# Patient Record
Sex: MALE | Race: WHITE | NOT HISPANIC OR LATINO | Employment: FULL TIME | URBAN - METROPOLITAN AREA
[De-identification: names, ages, dates, MRNs, and addresses within clinical notes are randomized per-mention and may not be internally consistent; named-entity substitution may affect disease eponyms.]

---

## 2022-03-17 ENCOUNTER — OFFICE VISIT (OUTPATIENT)
Dept: FAMILY MEDICINE CLINIC | Facility: CLINIC | Age: 42
End: 2022-03-17

## 2022-03-17 VITALS
BODY MASS INDEX: 31.99 KG/M2 | HEIGHT: 69 IN | TEMPERATURE: 98 F | DIASTOLIC BLOOD PRESSURE: 90 MMHG | OXYGEN SATURATION: 98 % | WEIGHT: 216 LBS | SYSTOLIC BLOOD PRESSURE: 148 MMHG | HEART RATE: 64 BPM

## 2022-03-17 DIAGNOSIS — L02.91 ABSCESS: ICD-10-CM

## 2022-03-17 DIAGNOSIS — R03.0 ELEVATED BP WITHOUT DIAGNOSIS OF HYPERTENSION: ICD-10-CM

## 2022-03-17 DIAGNOSIS — Z13.220 LIPID SCREENING: ICD-10-CM

## 2022-03-17 DIAGNOSIS — Z13.29 SCREENING FOR THYROID DISORDER: ICD-10-CM

## 2022-03-17 DIAGNOSIS — Z01.89 ENCOUNTER FOR ROUTINE LABORATORY TESTING: Primary | ICD-10-CM

## 2022-03-17 PROBLEM — Z78.9 NO PERTINENT PAST MEDICAL HISTORY: Status: ACTIVE | Noted: 2022-03-17

## 2022-03-17 LAB
ALBUMIN SERPL-MCNC: 4.5 G/DL (ref 3.5–5.2)
ALBUMIN/GLOB SERPL: 1.7 G/DL
ALP SERPL-CCNC: 59 U/L (ref 39–117)
ALT SERPL W P-5'-P-CCNC: 28 U/L (ref 1–41)
ANION GAP SERPL CALCULATED.3IONS-SCNC: 9.2 MMOL/L (ref 5–15)
AST SERPL-CCNC: 21 U/L (ref 1–40)
BILIRUB SERPL-MCNC: 0.8 MG/DL (ref 0–1.2)
BUN SERPL-MCNC: 15 MG/DL (ref 6–20)
BUN/CREAT SERPL: 16.3 (ref 7–25)
CALCIUM SPEC-SCNC: 9.4 MG/DL (ref 8.6–10.5)
CHLORIDE SERPL-SCNC: 101 MMOL/L (ref 98–107)
CHOLEST SERPL-MCNC: 188 MG/DL (ref 0–200)
CO2 SERPL-SCNC: 28.8 MMOL/L (ref 22–29)
CREAT SERPL-MCNC: 0.92 MG/DL (ref 0.76–1.27)
DEPRECATED RDW RBC AUTO: 41 FL (ref 37–54)
EGFRCR SERPLBLD CKD-EPI 2021: 107.8 ML/MIN/1.73
ERYTHROCYTE [DISTWIDTH] IN BLOOD BY AUTOMATED COUNT: 12.7 % (ref 12.3–15.4)
GLOBULIN UR ELPH-MCNC: 2.6 GM/DL
GLUCOSE SERPL-MCNC: 96 MG/DL (ref 65–99)
HCT VFR BLD AUTO: 46.5 % (ref 37.5–51)
HDLC SERPL-MCNC: 40 MG/DL (ref 40–60)
HGB BLD-MCNC: 15.8 G/DL (ref 13–17.7)
LDLC SERPL CALC-MCNC: 135 MG/DL (ref 0–100)
LDLC/HDLC SERPL: 3.37 {RATIO}
MCH RBC QN AUTO: 30.2 PG (ref 26.6–33)
MCHC RBC AUTO-ENTMCNC: 34 G/DL (ref 31.5–35.7)
MCV RBC AUTO: 88.9 FL (ref 79–97)
PLATELET # BLD AUTO: 151 10*3/MM3 (ref 140–450)
PMV BLD AUTO: 12.4 FL (ref 6–12)
POTASSIUM SERPL-SCNC: 4.4 MMOL/L (ref 3.5–5.2)
PROT SERPL-MCNC: 7.1 G/DL (ref 6–8.5)
RBC # BLD AUTO: 5.23 10*6/MM3 (ref 4.14–5.8)
SODIUM SERPL-SCNC: 139 MMOL/L (ref 136–145)
TRIGL SERPL-MCNC: 67 MG/DL (ref 0–150)
TSH SERPL DL<=0.05 MIU/L-ACNC: 1.47 UIU/ML (ref 0.27–4.2)
VLDLC SERPL-MCNC: 13 MG/DL (ref 5–40)
WBC NRBC COR # BLD: 5.43 10*3/MM3 (ref 3.4–10.8)

## 2022-03-17 PROCEDURE — 99203 OFFICE O/P NEW LOW 30 MIN: CPT | Performed by: NURSE PRACTITIONER

## 2022-03-17 PROCEDURE — 80050 GENERAL HEALTH PANEL: CPT | Performed by: NURSE PRACTITIONER

## 2022-03-17 PROCEDURE — 10060 I&D ABSCESS SIMPLE/SINGLE: CPT | Performed by: NURSE PRACTITIONER

## 2022-03-17 PROCEDURE — 80061 LIPID PANEL: CPT | Performed by: NURSE PRACTITIONER

## 2022-03-17 NOTE — PATIENT INSTRUCTIONS
Preventive Care 40-64 Years Old, Male  Preventive care refers to lifestyle choices and visits with your health care provider that can promote health and wellness. This includes:  A yearly physical exam. This is also called an annual well check.  Regular dental and eye exams.  Immunizations.  Screening for certain conditions.  Healthy lifestyle choices, such as eating a healthy diet, getting regular exercise, not using drugs or products that contain nicotine and tobacco, and limiting alcohol use.  What can I expect for my preventive care visit?  Physical exam  Your health care provider will check:  Height and weight. These may be used to calculate body mass index (BMI), which is a measurement that tells if you are at a healthy weight.  Heart rate and blood pressure.  Your skin for abnormal spots.  Counseling  Your health care provider may ask you questions about:  Alcohol, tobacco, and drug use.  Emotional well-being.  Home and relationship well-being.  Sexual activity.  Eating habits.  Work and work environment.  What immunizations do I need?    Influenza (flu) vaccine  This is recommended every year.  Tetanus, diphtheria, and pertussis (Tdap) vaccine  You may need a Td booster every 10 years.  Varicella (chickenpox) vaccine  You may need this vaccine if you have not already been vaccinated.  Zoster (shingles) vaccine  You may need this after age 60.  Measles, mumps, and rubella (MMR) vaccine  You may need at least one dose of MMR if you were born in 1957 or later. You may also need a second dose.  Pneumococcal conjugate (PCV13) vaccine  You may need this if you have certain conditions and were not previously vaccinated.  Pneumococcal polysaccharide (PPSV23) vaccine  You may need one or two doses if you smoke cigarettes or if you have certain conditions.  Meningococcal conjugate (MenACWY) vaccine  You may need this if you have certain conditions.  Hepatitis A vaccine  You may need this if you have certain conditions  or if you travel or work in places where you may be exposed to hepatitis A.  Hepatitis B vaccine  You may need this if you have certain conditions or if you travel or work in places where you may be exposed to hepatitis B.  Haemophilus influenzae type b (Hib) vaccine  You may need this if you have certain risk factors.  Human papillomavirus (HPV) vaccine  If recommended by your health care provider, you may need three doses over 6 months.  You may receive vaccines as individual doses or as more than one vaccine together in one shot (combination vaccines). Talk with your health care provider about the risks and benefits of combination vaccines.  What tests do I need?  Blood tests  Lipid and cholesterol levels. These may be checked every 5 years, or more frequently if you are over 50 years old.  Hepatitis C test.  Hepatitis B test.  Screening  Lung cancer screening. You may have this screening every year starting at age 55 if you have a 30-pack-year history of smoking and currently smoke or have quit within the past 15 years.  Prostate cancer screening. Recommendations will vary depending on your family history and other risks.  Colorectal cancer screening. All adults should have this screening starting at age 50 and continuing until age 75. Your health care provider may recommend screening at age 45 if you are at increased risk. You will have tests every 1-10 years, depending on your results and the type of screening test.  Diabetes screening. This is done by checking your blood sugar (glucose) after you have not eaten for a while (fasting). You may have this done every 1-3 years.  Sexually transmitted disease (STD) testing.  Follow these instructions at home:  Eating and drinking  Eat a diet that includes fresh fruits and vegetables, whole grains, lean protein, and low-fat dairy products.  Take vitamin and mineral supplements as recommended by your health care provider.  Do not drink alcohol if your health care  provider tells you not to drink.  If you drink alcohol:  Limit how much you have to 0-2 drinks a day.  Be aware of how much alcohol is in your drink. In the [place] one drink equals one 12 oz bottle of beer (355 mL), one 5 oz glass of wine (148 mL), or one 1½ oz glass of hard liquor (44 mL).  Lifestyle  Take daily care of your teeth and gums.  Stay active. Exercise for at least 30 minutes on 5 or more days each week.  Do not use any products that contain nicotine or tobacco, such as cigarettes, e-cigarettes, and chewing tobacco. If you need help quitting, ask your health care provider.  If you are sexually active, practice safe sex. Use a condom or other form of protection to prevent STIs (sexually transmitted infections).  Talk with your health care provider about taking a low-dose aspirin every day starting at age 50.  What's next?  Go to your health care provider once a year for a well check visit.  Ask your health care provider how often you should have your eyes and teeth checked.  Stay up to date on all vaccines.  This information is not intended to replace advice given to you by your health care provider. Make sure you discuss any questions you have with your health care provider.  Document Revised: 12/12/2019 Document Reviewed: 12/12/2019  Elsevier Patient Education © 2020 [employer]

## 2022-03-17 NOTE — PROGRESS NOTES
"Chief Complaint  Establish Care , cyst on back     Subjective          Akash Springer is a 41 y.o. male who presents to [facility]    History of Present Illness    Cyst on back ongoing for 7-8 years. Pt reports not painful but growing.     Mild elevation in b/p noted.       PHQ-2 Total Score:     PHQ-9 Total Score:         Review of Systems   Respiratory: Negative for chest tightness and shortness of breath.    Gastrointestinal: Negative for abdominal pain.   Genitourinary: Negative for difficulty urinating.   Skin:        Cyst left shoulder posterior   Neurological: Negative for headaches.   Psychiatric/Behavioral: Negative for sleep disturbance. The patient is not nervous/anxious.           Medical History: has no past medical history on file.     Surgical History: has a past surgical history that includes Hernia repair and Gallbladder surgery.     Family History: family history is not on file.     Social History: reports that he quit smoking about 4 years ago. His smoking use included cigarettes. He smoked 0.50 packs per day. He quit smokeless tobacco use about 2 years ago.  His smokeless tobacco use included chew.    Allergies: Patient has no known allergies.      Health Maintenance Due   Topic Date Due   • ANNUAL PHYSICAL  Never done   • TDAP/TD VACCINES (2 - Tdap) 08/06/2007   • HEPATITIS C SCREENING  Never done          No current outpatient medications on file.      Immunization History   Administered Date(s) Administered   • Td 08/06/1997         Objective       Vitals:    03/17/22 0844   BP: 148/90   BP Location: Right arm   Patient Position: Sitting   Pulse: 64   Temp: 98 °F (36.7 °C)   SpO2: 98%   Weight: 98 kg (216 lb)   Height: 175.3 cm (69\")      Body mass index is 31.9 kg/m².   Wt Readings from Last 3 Encounters:   03/17/22 98 kg (216 lb)      BP Readings from Last 3 Encounters:   03/17/22 148/90        Physical Exam  Vitals reviewed.   Constitutional:       " Appearance: Normal appearance. He is well-developed.   HENT:      Head: Normocephalic and atraumatic.   Eyes:      Conjunctiva/sclera: Conjunctivae normal.      Pupils: Pupils are equal, round, and reactive to light.   Cardiovascular:      Rate and Rhythm: Normal rate and regular rhythm.      Heart sounds: Normal heart sounds. No murmur heard.  Pulmonary:      Effort: Pulmonary effort is normal.      Breath sounds: Normal breath sounds. No wheezing or rhonchi.   Abdominal:      General: Bowel sounds are normal. There is no distension.      Palpations: Abdomen is soft.      Tenderness: There is no abdominal tenderness.   Skin:     General: Skin is warm and dry.      Findings: Abscess present.      Comments: 3 cm abscess present on left posterior shoulder.    Neurological:      Mental Status: He is alert and oriented to person, place, and time.   Psychiatric:         Mood and Affect: Mood and affect normal.         Behavior: Behavior normal.         Thought Content: Thought content normal.         Judgment: Judgment normal.             Result Review :         Incision & Drainage    Date/Time: 3/17/2022 10:15 AM  Performed by: Rosina Florentino APRN  Authorized by: Rosina Florentino APRN   Type: abscess  Body area: upper extremity  Location details: left shoulder  Anesthesia: local infiltration    Anesthesia:  Local Anesthetic: lidocaine 1% without epinephrine  Anesthetic total: 3 mL    Sedation:  Patient sedated: no    Scalpel size: 10  Needle gauge: 22  Incision type: single straight  Drainage: purulent  Drainage amount: moderate  Wound treatment: wound left open  Packing material: Vaseline gauze  Patient tolerance: patient tolerated the procedure well with no immediate complications  Comments: Site prepped with betadine, injected at base with lidocaine 1%, central 1 cm incision made. Large amount of white thick drainage expressed. Large capsule removed. Bleeding controlled. vasoline and non-adherenant dressing place.                Assessment and Plan        Diagnoses and all orders for this visit:    1. Encounter for routine laboratory testing (Primary)  -     CBC (No Diff)    2. Screening for thyroid disorder  -     TSH    3. Lipid screening  -     Lipid Panel  -     Comprehensive Metabolic Panel    4. Abscess  -     Incision & Drainage    5. Elevated BP without diagnosis of hypertension  Comments:  monitor b/p call with readings above 140/90.      Keep wound clean and dry. Apply vasoline and non-adherent dressing.     Follow Up     Return in about 1 year (around 3/17/2023) for Annual physical.     Updated annual wellness visit checklist.  Immunizations up-to-date.  Screening up-to-date or ordered.  Recommend yearly dental and eye exams. Also discussed monitoring of blood pressure and lipids.        Patient was given instructions and counseling regarding his condition or for health maintenance advice. Please see specific information pulled into the AVS if appropriate.     CHARO Heredia

## 2022-03-21 ENCOUNTER — TELEPHONE (OUTPATIENT)
Dept: FAMILY MEDICINE CLINIC | Facility: CLINIC | Age: 42
End: 2022-03-21

## 2022-12-13 ENCOUNTER — OFFICE VISIT (OUTPATIENT)
Dept: FAMILY MEDICINE CLINIC | Facility: CLINIC | Age: 42
End: 2022-12-13

## 2022-12-13 VITALS
BODY MASS INDEX: 32.7 KG/M2 | TEMPERATURE: 98 F | HEIGHT: 69 IN | SYSTOLIC BLOOD PRESSURE: 142 MMHG | HEART RATE: 74 BPM | WEIGHT: 220.8 LBS | OXYGEN SATURATION: 98 % | DIASTOLIC BLOOD PRESSURE: 98 MMHG

## 2022-12-13 DIAGNOSIS — I10 PRIMARY HYPERTENSION: Primary | ICD-10-CM

## 2022-12-13 PROCEDURE — 99203 OFFICE O/P NEW LOW 30 MIN: CPT | Performed by: NURSE PRACTITIONER

## 2022-12-13 RX ORDER — LOSARTAN POTASSIUM 50 MG/1
50 TABLET ORAL DAILY
Qty: 30 TABLET | Refills: 2 | Status: SHIPPED | OUTPATIENT
Start: 2022-12-13 | End: 2023-01-13 | Stop reason: SDUPTHER

## 2022-12-13 NOTE — PROGRESS NOTES
"Chief Complaint  Hypertension (Increasing higher blood pressure readings, Stroke history in family )    Subjective         Akash Springer presents to [facility]  HPI     Had a physical last week and BP was elevated.  Mom was 59 and passed away with a stroke, father has had 2 strokes, brother and sister both have heart conditions. Has noticed increased headaches.    PHQ-2 Total Score:    PHQ-9 Total Score:      Review of Systems    Social History     Socioeconomic History   • Marital status:    Tobacco Use   • Smoking status: Former     Packs/day: 0.50     Types: Cigarettes     Quit date:      Years since quittin.9   • Smokeless tobacco: Former     Types: Chew     Quit date:    Vaping Use   • Vaping Use: Never used   Substance and Sexual Activity   • Alcohol use: Yes     Comment: Social   • Drug use: Never        Objective     Vitals:    22 1448 22 1520   BP: (!) 146/107 142/98   BP Location: Right arm Right arm   Patient Position: Sitting Sitting   Cuff Size: Adult Adult   Pulse: 74    Temp: 98 °F (36.7 °C)    TempSrc: Temporal    SpO2: 98%    Weight: 100 kg (220 lb 12.8 oz)    Height: 175.3 cm (69\")         Body mass index is 32.61 kg/m².    Wt Readings from Last 3 Encounters:   22 100 kg (220 lb 12.8 oz)   22 98 kg (216 lb)       BP Readings from Last 3 Encounters:   22 142/98   22 148/90             Physical Exam  Vitals reviewed.   Constitutional:       Appearance: Normal appearance.   HENT:      Head: Normocephalic and atraumatic.   Eyes:      Conjunctiva/sclera: Conjunctivae normal.      Pupils: Pupils are equal, round, and reactive to light.   Cardiovascular:      Rate and Rhythm: Normal rate and regular rhythm.      Pulses: Normal pulses.      Heart sounds: Normal heart sounds.   Pulmonary:      Effort: Pulmonary effort is normal.      Breath sounds: Normal breath sounds.   Abdominal:      General: Bowel sounds " are normal.      Palpations: Abdomen is soft.   Musculoskeletal:      Cervical back: Normal range of motion.   Skin:     General: Skin is warm and dry.   Neurological:      Mental Status: He is alert and oriented to person, place, and time.   Psychiatric:         Mood and Affect: Mood normal.         Behavior: Behavior normal.         Thought Content: Thought content normal.         Judgment: Judgment normal.          Result Review :   The following data was reviewed by: CHARO Dooley on 12/13/2022:      Procedures    Assessment and Plan   Diagnoses and all orders for this visit:    1. Primary hypertension (Primary)  Comments:  Adgised patient to buy a BP monitor at home.  Start taking BP readings once a day at different times of the day and record readings to bring to visit.  Orders:  -     losartan (Cozaar) 50 MG tablet; Take 1 tablet by mouth Daily.  Dispense: 30 tablet; Refill: 2        Follow Up   Return in about 4 weeks (around 1/10/2023).     Patient advised to monitor blood pressure at home and journal readings.  Patient informed that a blood pressure reading at home of more than 130/80 is considered hypertension.  For readings greater than 140/90 or higher patient is advised to follow-up in the office with readings for management.  Patient advised to limit sodium intake.    Patient was given instructions and counseling regarding his condition or for health maintenance advice. Please see specific information pulled into the AVS if appropriate.     Please note that portions of this note were completed with a voice recognition program.    Electronically signed by CHARO Dooley  12/13/2022, 15:25 EST    Answers for HPI/ROS submitted by the patient on 12/7/2022  What is the primary reason for your visit?: High Blood Pressure

## 2023-01-13 ENCOUNTER — OFFICE VISIT (OUTPATIENT)
Dept: FAMILY MEDICINE CLINIC | Facility: CLINIC | Age: 43
End: 2023-01-13
Payer: COMMERCIAL

## 2023-01-13 VITALS
DIASTOLIC BLOOD PRESSURE: 86 MMHG | BODY MASS INDEX: 33.24 KG/M2 | SYSTOLIC BLOOD PRESSURE: 134 MMHG | OXYGEN SATURATION: 99 % | HEART RATE: 78 BPM | TEMPERATURE: 97.6 F | WEIGHT: 224.4 LBS | HEIGHT: 69 IN

## 2023-01-13 DIAGNOSIS — I10 PRIMARY HYPERTENSION: Primary | ICD-10-CM

## 2023-01-13 DIAGNOSIS — E78.2 MIXED HYPERLIPIDEMIA: ICD-10-CM

## 2023-01-13 PROCEDURE — 99213 OFFICE O/P EST LOW 20 MIN: CPT | Performed by: NURSE PRACTITIONER

## 2023-01-13 RX ORDER — LOSARTAN POTASSIUM 50 MG/1
50 TABLET ORAL DAILY
Qty: 90 TABLET | Refills: 1 | Status: SHIPPED | OUTPATIENT
Start: 2023-01-13

## 2023-01-13 NOTE — PROGRESS NOTES
"Chief Complaint  Hypertension    Subjective         Akash Springer presents to [facility]  HPI     HTN-taking Losartan, headaches improved, BP's looking good at home.  Feeling good.    PHQ-2 Total Score:    PHQ-9 Total Score:      Review of Systems    Social History     Socioeconomic History   • Marital status:    Tobacco Use   • Smoking status: Former     Packs/day: 0.50     Types: Cigarettes     Quit date:      Years since quittin.0   • Smokeless tobacco: Former     Types: Chew     Quit date:    Vaping Use   • Vaping Use: Never used   Substance and Sexual Activity   • Alcohol use: Yes     Comment: Social   • Drug use: Never        Objective     Vitals:    23 0707   BP: 134/86   BP Location: Left arm   Patient Position: Sitting   Cuff Size: Adult   Pulse: 78   Temp: 97.6 °F (36.4 °C)   TempSrc: Temporal   SpO2: 99%   Weight: 102 kg (224 lb 6.4 oz)   Height: 175.3 cm (69\")        Body mass index is 33.14 kg/m².    Wt Readings from Last 3 Encounters:   23 102 kg (224 lb 6.4 oz)   22 100 kg (220 lb 12.8 oz)   22 98 kg (216 lb)       BP Readings from Last 3 Encounters:   23 134/86   22 142/98   22 148/90           Physical Exam  Vitals reviewed.   Constitutional:       Appearance: Normal appearance.   HENT:      Head: Normocephalic and atraumatic.   Eyes:      Conjunctiva/sclera: Conjunctivae normal.      Pupils: Pupils are equal, round, and reactive to light.   Cardiovascular:      Rate and Rhythm: Normal rate and regular rhythm.      Pulses: Normal pulses.      Heart sounds: Normal heart sounds.   Pulmonary:      Effort: Pulmonary effort is normal.      Breath sounds: Normal breath sounds.   Abdominal:      General: Bowel sounds are normal.      Palpations: Abdomen is soft.   Musculoskeletal:      Cervical back: Normal range of motion.   Skin:     General: Skin is warm and dry.   Neurological:      Mental Status: He " is alert and oriented to person, place, and time.   Psychiatric:         Mood and Affect: Mood normal.         Behavior: Behavior normal.         Thought Content: Thought content normal.         Judgment: Judgment normal.          Result Review :   The following data was reviewed by: CHARO Dooley on 01/13/2023:      Procedures    Assessment and Plan   Diagnoses and all orders for this visit:    1. Primary hypertension (Primary)  Comments:  Advised to continue to take BP at home 2-3 times a week.  Orders:  -     Comprehensive Metabolic Panel; Future  -     CBC & Differential; Future  -     TSH; Future  -     T4, free; Future  -     losartan (Cozaar) 50 MG tablet; Take 1 tablet by mouth Daily.  Dispense: 90 tablet; Refill: 1    2. Mixed hyperlipidemia  -     Lipid Panel; Future            Follow Up   Return in about 6 months (around 7/13/2023).  Patient was given instructions and counseling regarding his condition or for health maintenance advice. Please see specific information pulled into the AVS if appropriate.     Please note that portions of this note were completed with a voice recognition program.    Electronically signed by CHARO Dooley  01/13/2023, 08:18 EST

## 2023-07-14 PROBLEM — I10 PRIMARY HYPERTENSION: Status: ACTIVE | Noted: 2023-07-14

## 2024-09-18 ENCOUNTER — OFFICE VISIT (OUTPATIENT)
Dept: FAMILY MEDICINE CLINIC | Facility: CLINIC | Age: 44
End: 2024-09-18
Payer: COMMERCIAL

## 2024-09-18 VITALS
BODY MASS INDEX: 33.44 KG/M2 | TEMPERATURE: 98.8 F | HEIGHT: 69 IN | DIASTOLIC BLOOD PRESSURE: 97 MMHG | HEART RATE: 66 BPM | RESPIRATION RATE: 18 BRPM | OXYGEN SATURATION: 100 % | WEIGHT: 225.8 LBS | SYSTOLIC BLOOD PRESSURE: 130 MMHG

## 2024-09-18 DIAGNOSIS — E78.2 MIXED HYPERLIPIDEMIA: Primary | ICD-10-CM

## 2024-09-18 DIAGNOSIS — I10 PRIMARY HYPERTENSION: ICD-10-CM

## 2024-09-18 PROCEDURE — 99213 OFFICE O/P EST LOW 20 MIN: CPT | Performed by: NURSE PRACTITIONER

## 2024-09-18 RX ORDER — LOSARTAN POTASSIUM 50 MG/1
50 TABLET ORAL DAILY
Qty: 90 TABLET | Refills: 3 | Status: SHIPPED | OUTPATIENT
Start: 2024-09-18

## 2025-02-12 ENCOUNTER — OFFICE VISIT (OUTPATIENT)
Dept: FAMILY MEDICINE CLINIC | Facility: CLINIC | Age: 45
End: 2025-02-12
Payer: COMMERCIAL

## 2025-02-12 VITALS
WEIGHT: 222.2 LBS | BODY MASS INDEX: 32.91 KG/M2 | DIASTOLIC BLOOD PRESSURE: 85 MMHG | HEIGHT: 69 IN | OXYGEN SATURATION: 95 % | SYSTOLIC BLOOD PRESSURE: 134 MMHG | HEART RATE: 77 BPM

## 2025-02-12 DIAGNOSIS — F41.9 ANXIETY: Primary | ICD-10-CM

## 2025-02-12 PROCEDURE — 99214 OFFICE O/P EST MOD 30 MIN: CPT | Performed by: NURSE PRACTITIONER

## 2025-02-12 NOTE — PROGRESS NOTES
Chief Complaint  Anxiety (Wants meds for anxiety and anger flare ups )    Subjective          Akash Springer is a 44 y.o. male who presents to [facility]    History of Present Illness    Akash states he got  last year, since then he has realized that he and his wife want to work it out and our attempting reconciliation.  He states his problem is his short temper, easily annoyed, raises his voice quickly.  He has been to a therapist before but didn't think it helped.  He's here today because he would like to try something to help with his anxiety and stress.      PHQ-2 Total Score:     PHQ-9 Total Score:         Review of Systems   Respiratory:  Negative for shortness of breath.    Cardiovascular:  Negative for chest pain and palpitations.   Gastrointestinal:  Negative for nausea.   Neurological:  Negative for dizziness.   Psychiatric/Behavioral:  Negative for confusion.           Medical History: has a past medical history of Cholelithiasis (2016) and Hypertension.     Surgical History: has a past surgical history that includes Hernia repair; Gallbladder surgery; and Cholecystectomy (2016).     Family History: family history includes Hyperlipidemia in his father and mother; Stroke in his father and mother.     Social History: reports that he quit smoking about 7 years ago. His smoking use included cigarettes. He started smoking about 12 years ago. He has a 2.5 pack-year smoking history. He quit smokeless tobacco use about 5 years ago.  His smokeless tobacco use included chew. He reports current alcohol use of about 6.0 - 12.0 standard drinks of alcohol per week. He reports that he does not use drugs.    Allergies: Patient has no known allergies.      Health Maintenance Due   Topic Date Due    ANNUAL PHYSICAL  Never done    LIPID PANEL  07/11/2024            Current Outpatient Medications:     losartan (Cozaar) 50 MG tablet, Take 1 tablet by mouth Daily., Disp: 90  "tablet, Rfl: 3    sertraline (ZOLOFT) 50 MG tablet, Take 1 tablet by mouth Daily., Disp: 30 tablet, Rfl: 1      Immunization History   Administered Date(s) Administered    Td (TDVAX) 08/06/1997         Objective       Vitals:    02/12/25 0801   BP: 134/85   Pulse: 77   SpO2: 95%   Weight: 101 kg (222 lb 3.2 oz)   Height: 175.3 cm (69\")      Body mass index is 32.81 kg/m².   Wt Readings from Last 3 Encounters:   02/12/25 101 kg (222 lb 3.2 oz)   09/18/24 102 kg (225 lb 12.8 oz)   07/14/23 103 kg (228 lb)      BP Readings from Last 3 Encounters:   02/12/25 134/85   09/18/24 130/97   07/14/23 138/89                Physical Exam  Vitals reviewed.   Constitutional:       Appearance: Normal appearance.   Skin:     General: Skin is warm and dry.   Neurological:      Mental Status: He is alert and oriented to person, place, and time.   Psychiatric:         Mood and Affect: Mood normal.         Behavior: Behavior normal.         Thought Content: Thought content normal.         Judgment: Judgment normal.             Result Review :                          Assessment and Plan        Diagnoses and all orders for this visit:    1. Anxiety (Primary)  Comments:  Start on Sertraline 25 mg daily and fu in 6 weeks.  Orders:  -     sertraline (ZOLOFT) 50 MG tablet; Take 1 tablet by mouth Daily.  Dispense: 30 tablet; Refill: 1          Follow Up     Return in about 6 weeks (around 3/26/2025).    Patient was given instructions and counseling regarding his condition or for health maintenance advice. Please see specific information pulled into the AVS if appropriate. Patient understands the importance of having any ordered tests to be performed in a timely fashion.      I spent 30 minutes caring for Akash on this date of service. This time includes time spent by me in the following activities: preparing for the visit, reviewing tests, performing a medically appropriate examination and/or evaluation, counseling and educating the " patient/family/caregiver, referring and communicating with other health care professionals, documenting information in the medical record, independently interpreting results and communicating that information with the patient/family/caregiver, care coordination, ordering medications, and ordering test(s)      Anuradha Lance, APRN

## 2025-03-26 ENCOUNTER — OFFICE VISIT (OUTPATIENT)
Dept: FAMILY MEDICINE CLINIC | Facility: CLINIC | Age: 45
End: 2025-03-26
Payer: COMMERCIAL

## 2025-03-26 VITALS
HEIGHT: 69 IN | DIASTOLIC BLOOD PRESSURE: 89 MMHG | WEIGHT: 225 LBS | BODY MASS INDEX: 33.33 KG/M2 | HEART RATE: 61 BPM | SYSTOLIC BLOOD PRESSURE: 133 MMHG

## 2025-03-26 DIAGNOSIS — F41.9 ANXIETY: Primary | ICD-10-CM

## 2025-03-26 PROCEDURE — 99213 OFFICE O/P EST LOW 20 MIN: CPT | Performed by: NURSE PRACTITIONER

## 2025-03-26 NOTE — PROGRESS NOTES
"Chief Complaint  Anxiety, Hyperlipidemia, and Hypertension    Subjective        Akash Springer presents to [facility]  Anxiety  Presents for follow-up visit.  Patient reports no chest pain or nausea. The quality of sleep is good. Awakens seldom (If awakened it's due to child waking up.) during the night. Compliance with medications is %.   Hypertension  Associated symptoms include anxiety. Pertinent negatives include no chest pain, headaches or neck pain. Risk factors for coronary artery disease include male gender and stress.     Follow up  Pertinent negative symptoms include no abdominal pain, no anorexia, no joint pain, no change in stool, no chest pain, no chills, no congestion, no cough, no diaphoresis, no fatigue, no fever, no headaches, no joint swelling, no myalgias, no nausea, no neck pain, no numbness, no rash, no sore throat, no swollen glands, no dysuria, no vertigo, no visual change, no vomiting and no weakness.       Objective   Vital Signs:  /89   Pulse 61   Ht 175.3 cm (69\")   Wt 102 kg (225 lb)   PF 99 L/min   BMI 33.23 kg/m²   Estimated body mass index is 33.23 kg/m² as calculated from the following:    Height as of this encounter: 175.3 cm (69\").    Weight as of this encounter: 102 kg (225 lb).            Physical Exam  Vitals and nursing note reviewed.   Constitutional:       Appearance: Normal appearance.   Neurological:      General: No focal deficit present.      Mental Status: He is alert and oriented to person, place, and time. Mental status is at baseline.   Psychiatric:         Mood and Affect: Mood normal.         Behavior: Behavior normal.         Thought Content: Thought content normal.         Judgment: Judgment normal.        Result Review :        Assessment and Plan   Diagnoses and all orders for this visit:    1. Anxiety (Primary)  Comments:  Doing well on Sertraline 50 mg. Home life stressors are reduced. Wants to continue " on dose.  Orders:  -     sertraline (ZOLOFT) 50 MG tablet; Take 1 tablet by mouth Daily.  Dispense: 90 tablet; Refill: 3       Follow Up     Return in about 3 months (around 6/26/2025).  Patient was given instructions and counseling regarding his condition or for health maintenance advice. Please see specific information pulled into the AVS if appropriate.     ILiv, participated in this visit as a Family Nurse Practitioner student.

## 2025-06-26 ENCOUNTER — OFFICE VISIT (OUTPATIENT)
Dept: FAMILY MEDICINE CLINIC | Facility: CLINIC | Age: 45
End: 2025-06-26
Payer: COMMERCIAL

## 2025-06-26 VITALS
OXYGEN SATURATION: 100 % | BODY MASS INDEX: 33.62 KG/M2 | SYSTOLIC BLOOD PRESSURE: 124 MMHG | HEIGHT: 69 IN | HEART RATE: 54 BPM | WEIGHT: 227 LBS | DIASTOLIC BLOOD PRESSURE: 86 MMHG | TEMPERATURE: 98.1 F

## 2025-06-26 DIAGNOSIS — Z00.00 ENCOUNTER FOR MEDICAL EXAMINATION TO ESTABLISH CARE: ICD-10-CM

## 2025-06-26 DIAGNOSIS — F41.1 GAD (GENERALIZED ANXIETY DISORDER): ICD-10-CM

## 2025-06-26 DIAGNOSIS — I10 PRIMARY HYPERTENSION: ICD-10-CM

## 2025-06-26 DIAGNOSIS — E66.9 OBESITY (BMI 30-39.9): ICD-10-CM

## 2025-06-26 DIAGNOSIS — Z00.00 ANNUAL PHYSICAL EXAM: Primary | ICD-10-CM

## 2025-06-26 DIAGNOSIS — Z13.220 SCREENING FOR LIPID DISORDERS: ICD-10-CM

## 2025-06-26 DIAGNOSIS — Z13.29 SCREENING FOR THYROID DISORDER: ICD-10-CM

## 2025-06-26 NOTE — PROGRESS NOTES
"Akash Springer presents to [facility] with complaint of  Establish Care    SUBJECTIVE  History of Present Illness    History of Present Illness  The patient is a 44-year-old male who presents to establish care with a new provider in the clinic as his former primary care physician left the office. He is also following up for anxiety.    He was previously under the care of Anuradha Lance, who initiated an Zoloft 50 mg daily regimen a few months ago. He reports a general improvement in his condition, with a notable decrease in irritability, particularly towards his children. He has 3 children and feels better overall.     He has been on losartan for approximately 1.5 to 2 years to manage his blood pressure. He expresses interest in undergoing laboratory tests, as he has not had any recent ones.     He reports a history of smoking but has quit and no longer tolerates being around cigarette smoke.    SOCIAL HISTORY  He is a former smoker and has quit smoking. He has 3 children. 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      The following portions of the patient's history were reviewed and updated as appropriate: allergies, current medications, past family history, past medical history, past social history, past surgical history and problem list.    OBJECTIVE  Vital Signs:   /86   Pulse 54   Temp 98.1 °F (36.7 °C) (Oral)   Ht 175.3 cm (69\")   Wt 103 kg (227 lb)   SpO2 100%   BMI 33.52 kg/m²       Physical Exam  Constitutional:       General: He is not in acute distress.     Appearance: He is obese. He is not ill-appearing.   HENT:      Head: Normocephalic and atraumatic.   Cardiovascular:      Rate and Rhythm: Normal rate and regular rhythm.      Pulses: Normal pulses.      Heart sounds: Normal heart sounds.   Pulmonary:      Effort: Pulmonary effort is normal.      Breath sounds: Normal breath sounds.   Musculoskeletal:      Cervical back: Neck supple. "   Skin:     General: Skin is warm and dry.   Neurological:      General: No focal deficit present.      Mental Status: He is alert and oriented to person, place, and time. Mental status is at baseline.   Psychiatric:         Mood and Affect: Mood normal.         Behavior: Behavior normal.              ASSESSMENT AND PLAN:  Diagnoses and all orders for this visit:    1. Annual physical exam (Primary)  -     CBC & Differential; Future  -     Comprehensive Metabolic Panel; Future    2. Encounter for medical examination to establish care    3. Screening for thyroid disorder  -     TSH Rfx On Abnormal To Free T4; Future    4. Screening for lipid disorders  -     Lipid Panel; Future    5. Primary hypertension    6. CHEVY (generalized anxiety disorder)    7. Obesity (BMI 30-39.9)        Assessment & Plan  1. Anxiety.  - Reports feeling better on current medication regimen.  Zoloft 50 mg daily  - Discussed the impact of anxiety on daily life and interactions with children.  - Continue current medication as prescribed.    2. Hypertension.  - Blood pressure is well-controlled on losartan 50 mg daily.  - Advised to undergo annual laboratory tests to monitor electrolyte levels, specifically potassium, and kidney function due to the potential impact of losartan.  - Fasting for 6 to 8 hours prior to the test is recommended.      Follow-up  - Follow-up appointment scheduled for 1 year. Can provider med refills when needed until next apt.       40 to 64: Counseling/Anticipatory Guidance Discussed: nutrition, physical activity, healthy weight, injury prevention, misuse of tobacco, dental health, mental health, immunizations, and screenings    Follow Up   Return in about 1 year (around 6/26/2026). Patient to notify office with any acute concerns or issues.  Patient verbalizes understanding, agrees with plan of care and has no further questions upon discharge.     Patient was given instructions and counseling regarding his condition or  for health maintenance advice. Please see specific information pulled into the AVS if appropriate.     Discussed the importance of following up with any needed screening tests/labs/specialist appointments and any requested follow-up recommended by me today. Importance of maintaining follow-up discussed and patient accepts that missed appointments can delay diagnosis and potentially lead to worsening of conditions.    Patient or patient representative verbalized consent for the use of Ambient Listening during the visit with  CHARO Lynn for chart documentation. 6/26/2025  08:30 EDT

## 2025-06-30 ENCOUNTER — LAB (OUTPATIENT)
Dept: LAB | Facility: HOSPITAL | Age: 45
End: 2025-06-30
Payer: COMMERCIAL

## 2025-06-30 DIAGNOSIS — Z00.00 ANNUAL PHYSICAL EXAM: ICD-10-CM

## 2025-06-30 DIAGNOSIS — Z13.220 SCREENING FOR LIPID DISORDERS: ICD-10-CM

## 2025-06-30 DIAGNOSIS — Z13.29 SCREENING FOR THYROID DISORDER: ICD-10-CM

## 2025-06-30 LAB
ALBUMIN SERPL-MCNC: 4.3 G/DL (ref 3.5–5.2)
ALBUMIN/GLOB SERPL: 1.7 G/DL
ALP SERPL-CCNC: 56 U/L (ref 39–117)
ALT SERPL W P-5'-P-CCNC: 22 U/L (ref 1–41)
ANION GAP SERPL CALCULATED.3IONS-SCNC: 7 MMOL/L (ref 5–15)
AST SERPL-CCNC: 21 U/L (ref 1–40)
BASOPHILS # BLD AUTO: 0.03 10*3/MM3 (ref 0–0.2)
BASOPHILS NFR BLD AUTO: 0.6 % (ref 0–1.5)
BILIRUB SERPL-MCNC: 1 MG/DL (ref 0–1.2)
BUN SERPL-MCNC: 14 MG/DL (ref 6–20)
BUN/CREAT SERPL: 15.1 (ref 7–25)
CALCIUM SPEC-SCNC: 9 MG/DL (ref 8.6–10.5)
CHLORIDE SERPL-SCNC: 104 MMOL/L (ref 98–107)
CHOLEST SERPL-MCNC: 203 MG/DL (ref 0–200)
CO2 SERPL-SCNC: 27 MMOL/L (ref 22–29)
CREAT SERPL-MCNC: 0.93 MG/DL (ref 0.76–1.27)
DEPRECATED RDW RBC AUTO: 42.4 FL (ref 37–54)
EGFRCR SERPLBLD CKD-EPI 2021: 104.5 ML/MIN/1.73
EOSINOPHIL # BLD AUTO: 0.12 10*3/MM3 (ref 0–0.4)
EOSINOPHIL NFR BLD AUTO: 2.3 % (ref 0.3–6.2)
ERYTHROCYTE [DISTWIDTH] IN BLOOD BY AUTOMATED COUNT: 13.1 % (ref 12.3–15.4)
GLOBULIN UR ELPH-MCNC: 2.6 GM/DL
GLUCOSE SERPL-MCNC: 97 MG/DL (ref 65–99)
HCT VFR BLD AUTO: 43.2 % (ref 37.5–51)
HDLC SERPL-MCNC: 38 MG/DL (ref 40–60)
HGB BLD-MCNC: 14.7 G/DL (ref 13–17.7)
IMM GRANULOCYTES # BLD AUTO: 0.01 10*3/MM3 (ref 0–0.05)
IMM GRANULOCYTES NFR BLD AUTO: 0.2 % (ref 0–0.5)
LDLC SERPL CALC-MCNC: 156 MG/DL (ref 0–100)
LDLC/HDLC SERPL: 4.07 {RATIO}
LYMPHOCYTES # BLD AUTO: 1.58 10*3/MM3 (ref 0.7–3.1)
LYMPHOCYTES NFR BLD AUTO: 30.9 % (ref 19.6–45.3)
MCH RBC QN AUTO: 30.2 PG (ref 26.6–33)
MCHC RBC AUTO-ENTMCNC: 34 G/DL (ref 31.5–35.7)
MCV RBC AUTO: 88.9 FL (ref 79–97)
MONOCYTES # BLD AUTO: 0.4 10*3/MM3 (ref 0.1–0.9)
MONOCYTES NFR BLD AUTO: 7.8 % (ref 5–12)
NEUTROPHILS NFR BLD AUTO: 2.98 10*3/MM3 (ref 1.7–7)
NEUTROPHILS NFR BLD AUTO: 58.2 % (ref 42.7–76)
NRBC BLD AUTO-RTO: 0 /100 WBC (ref 0–0.2)
PLATELET # BLD AUTO: 150 10*3/MM3 (ref 140–450)
PMV BLD AUTO: 12.8 FL (ref 6–12)
POTASSIUM SERPL-SCNC: 4.4 MMOL/L (ref 3.5–5.2)
PROT SERPL-MCNC: 6.9 G/DL (ref 6–8.5)
RBC # BLD AUTO: 4.86 10*6/MM3 (ref 4.14–5.8)
SODIUM SERPL-SCNC: 138 MMOL/L (ref 136–145)
TRIGL SERPL-MCNC: 52 MG/DL (ref 0–150)
TSH SERPL DL<=0.05 MIU/L-ACNC: 2.39 UIU/ML (ref 0.27–4.2)
VLDLC SERPL-MCNC: 9 MG/DL (ref 5–40)
WBC NRBC COR # BLD AUTO: 5.12 10*3/MM3 (ref 3.4–10.8)

## 2025-06-30 PROCEDURE — 80050 GENERAL HEALTH PANEL: CPT

## 2025-06-30 PROCEDURE — 80061 LIPID PANEL: CPT

## 2025-06-30 PROCEDURE — 36415 COLL VENOUS BLD VENIPUNCTURE: CPT
